# Patient Record
Sex: FEMALE | Race: WHITE
[De-identification: names, ages, dates, MRNs, and addresses within clinical notes are randomized per-mention and may not be internally consistent; named-entity substitution may affect disease eponyms.]

---

## 2018-01-24 ENCOUNTER — HOSPITAL ENCOUNTER (OUTPATIENT)
Dept: HOSPITAL 62 - OD | Age: 68
End: 2018-01-24
Attending: FAMILY MEDICINE
Payer: MEDICARE

## 2018-01-24 DIAGNOSIS — E66.01: ICD-10-CM

## 2018-01-24 DIAGNOSIS — E78.5: Primary | ICD-10-CM

## 2018-01-24 DIAGNOSIS — E55.9: ICD-10-CM

## 2018-01-24 DIAGNOSIS — M15.0: ICD-10-CM

## 2018-01-24 LAB
ALBUMIN SERPL-MCNC: 4.2 G/DL (ref 3.5–5)
ALP SERPL-CCNC: 102 U/L (ref 38–126)
ALT SERPL-CCNC: 38 U/L (ref 9–52)
ANION GAP SERPL CALC-SCNC: 9 MMOL/L (ref 5–19)
AST SERPL-CCNC: 25 U/L (ref 14–36)
BILIRUB DIRECT SERPL-MCNC: 0.3 MG/DL (ref 0–0.4)
BILIRUB SERPL-MCNC: 0.6 MG/DL (ref 0.2–1.3)
BUN SERPL-MCNC: 20 MG/DL (ref 7–20)
CALCIUM: 10.9 MG/DL (ref 8.4–10.2)
CHLORIDE SERPL-SCNC: 108 MMOL/L (ref 98–107)
CHOLEST SERPL-MCNC: 217.25 MG/DL (ref 0–200)
CO2 SERPL-SCNC: 26 MMOL/L (ref 22–30)
GLUCOSE SERPL-MCNC: 93 MG/DL (ref 75–110)
POTASSIUM SERPL-SCNC: 4.2 MMOL/L (ref 3.6–5)
PROT SERPL-MCNC: 6.8 G/DL (ref 6.3–8.2)
SODIUM SERPL-SCNC: 142.5 MMOL/L (ref 137–145)

## 2018-01-24 PROCEDURE — 80053 COMPREHEN METABOLIC PANEL: CPT

## 2018-01-24 PROCEDURE — 36415 COLL VENOUS BLD VENIPUNCTURE: CPT

## 2018-01-24 PROCEDURE — 82306 VITAMIN D 25 HYDROXY: CPT

## 2018-01-24 PROCEDURE — 82465 ASSAY BLD/SERUM CHOLESTEROL: CPT

## 2018-02-26 ENCOUNTER — HOSPITAL ENCOUNTER (OUTPATIENT)
Dept: HOSPITAL 62 - WI | Age: 68
End: 2018-02-26
Attending: FAMILY MEDICINE
Payer: MEDICARE

## 2018-02-26 DIAGNOSIS — Z12.31: Primary | ICD-10-CM

## 2018-02-26 PROCEDURE — 77063 BREAST TOMOSYNTHESIS BI: CPT

## 2018-02-26 PROCEDURE — 77067 SCR MAMMO BI INCL CAD: CPT

## 2018-02-26 NOTE — WOMENS IMAGING REPORT
EXAM DESCRIPTION:  3D SCREENING MAMMO BILAT



COMPLETED DATE/TIME:  2/26/2018 10:26 am



REASON FOR STUDY:  SCREENING MAMMO



COMPARISON:  2014 to 2017



TECHNIQUE:  Standard craniocaudal and mediolateral oblique views of each breast recorded using digita
l acquisition and breast tomosynthesis.



LIMITATIONS:  None.



FINDINGS:  No masses, calcifications or architectural distortion. No areas of suspicion.

Read with the assistance of CAD.

.[facility] - R2 Cenova Version 1.3

.[facility] - R2 Cenova Version 1.3

[facility] - R2 Cenova Version 2.4

.[facility] - R2 Cenova Version 2.4

.[facility] - R2  Version 9.2



IMPRESSION:  NORMAL MAMMOGRAM.  BIRADS 1.



BREAST DENSITY:  b. There are scattered areas of fibroglandular density.



BIRAD:  1 NEGATIVE



RECOMMENDATION:  ROUTINE SCREENING



COMMENT:  The patient has been notified of the results by letter per MQSA requirements. Additional no
tification policies are in place for contacting patient with suspicious or incomplete findings.

Quality ID #225: The American College of Radiology recommends an annual screening mammogram for women
 aged 40 years or over. This facility utilizes a reminder system to ensure that all patients receive 
reminder letters, and/or direct phone calls for appointments. This includes reminders for routine scr
eening mammograms, diagnostic mammograms, or other Breast Imaging Interventions when appropriate.  Th
is patient will be placed in the appropriate reminder system.

The American College of Radiology (ACR) has developed recommendations for screening MRI of the breast
s in certain patient populations, to be used in conjunction with mammography.  Breast MRI surveillanc
e may be appropriate for women with more than 20% lifetime risk of developing breast cancer  as deter
mined by genetic testing, significant family history of the disease, or history of mantle radiation f
or Hodgkins Disease.  ACR Practice Guidelines 2008.

[employer]



PQRS 6045F: Fluoroscopic imaging is not utilized for breast tomosynthesis.



TECHNICAL DOCUMENTATION:  FINDING NUMBER: (1)

ASSESSMENT:  (1)

JOB ID:  [id number]

 2011 [employer]- All Rights Reserved



Reading location - IP/workstation name: [email or web address]

## 2018-04-26 ENCOUNTER — HOSPITAL ENCOUNTER (OUTPATIENT)
Dept: HOSPITAL 62 - OD | Age: 68
End: 2018-04-26
Attending: FAMILY MEDICINE
Payer: MEDICARE

## 2018-04-26 DIAGNOSIS — M54.5: Primary | ICD-10-CM

## 2018-04-26 PROCEDURE — 72110 X-RAY EXAM L-2 SPINE 4/>VWS: CPT

## 2018-04-26 NOTE — RADIOLOGY REPORT (SQ)
EXAM DESCRIPTION:  LUMBAR SPINE COMPLETE



COMPLETED DATE/TIME:  4/26/2018 3:08 pm



REASON FOR STUDY:  LOW BACK PAIN M54.5  LOW BACK PAIN



COMPARISON:  None.



NUMBER OF VIEWS:  Five views including obliques.



TECHNIQUE:  AP, lateral, oblique, and sacral radiographic images acquired of the lumbar spine.



LIMITATIONS:  None.



FINDINGS:  MINERALIZATION: Normal.

SEGMENTATION: Normal.  No transitional anatomy.

ALIGNMENT: Normal.

VERTEBRAE: Maintained height.  No fracture or worrisome bone lesion.

DISCS: Preserved height.  No significant osteophytes or end plate irregularity.

POSTERIOR ELEMENTS: Pedicles and facets are intact.  No pars defect or posterior arch defects.

HARDWARE: None in the spine.

PARASPINAL SOFT TISSUES: Normal.

PELVIS: Intact as visualized. No fractures or worrisome bone lesions. SI joints intact.

OTHER: No other significant finding.



IMPRESSION:  NORMAL 5 VIEW LUMBAR SPINE.



TECHNICAL DOCUMENTATION:  JOB ID:  [id number]

 2011 [employer]- All Rights Reserved



Reading location - IP/workstation name: [email or web address]

## 2019-03-01 ENCOUNTER — HOSPITAL ENCOUNTER (OUTPATIENT)
Dept: HOSPITAL 62 - WI | Age: 69
End: 2019-03-01
Attending: PHYSICIAN ASSISTANT
Payer: MEDICARE

## 2019-03-01 DIAGNOSIS — M81.0: Primary | ICD-10-CM

## 2019-03-01 DIAGNOSIS — Z12.31: ICD-10-CM

## 2019-03-01 PROCEDURE — 77063 BREAST TOMOSYNTHESIS BI: CPT

## 2019-03-01 PROCEDURE — 77067 SCR MAMMO BI INCL CAD: CPT

## 2019-03-01 PROCEDURE — 77080 DXA BONE DENSITY AXIAL: CPT

## 2019-03-01 NOTE — WOMENS IMAGING REPORT
EXAM DESCRIPTION:  3D SCREENING MAMMO BILAT



COMPLETED DATE/TIME:  3/1/2019 9:43 am



REASON FOR STUDY:  Z12.31 ENCOUNTER FOR SCREENING MAMMOGRAM FOR MALIGNANT NEOPLASM OF BREAST M81.0  A
GE-RELATED OSTEOPOROSIS W/O CURRENT PATHOLOGICAL FRAC Z12.31  ENCNTR SCREEN MAMMOGRAM FOR MALIGNANT N
EOPLASM OF ANETTE



COMPARISON:  Multiple since 2014



TECHNIQUE:  Standard craniocaudal and mediolateral oblique views of each breast recorded using digita
l acquisition and breast tomosynthesis.



LIMITATIONS:  None.



FINDINGS:  No masses, calcifications or architectural distortion. No areas of suspicion.

Read with the assistance of CAD.

.[facility] - R2 Cenova Version 1.3

.[facility] - R2 Cenova Version 2.1

.[facility] - R2 Cenova Version 2.4

.[facility] - R2 Cenova Version 2.4

.[facility] - R2  Version 9.2



IMPRESSION:  NORMAL MAMMOGRAM.  BIRADS 1.



BREAST DENSITY:  b. There are scattered areas of fibroglandular density.



BIRAD:  1 NEGATIVE



RECOMMENDATION:  ROUTINE SCREENING



COMMENT:  The patient has been notified of the results by letter per SA requirements. Additional no
tification policies are in place for contacting patient with suspicious or incomplete findings.

Quality ID #225: The American College of Radiology recommends an annual screening mammogram for women
 aged 40 years or over. This facility utilizes a reminder system to ensure that all patients receive 
reminder letters, and/or direct phone calls for appointments. This includes reminders for routine scr
eening mammograms, diagnostic mammograms, or other Breast Imaging Interventions when appropriate.  Th
is patient will be placed in the appropriate reminder system.

The American College of Radiology (ACR) has developed recommendations for screening MRI of the breast
s in certain patient populations, to be used in conjunction with mammography.  Breast MRI surveillanc
e may be appropriate for women with more than 20% lifetime risk of developing breast cancer  as deter
mined by genetic testing, significant family history of the disease, or history of mantle radiation f
or Hodgkins Disease.  ACR Practice Guidelines 2008.

[employer]



PQRS 6045F: Fluoroscopic imaging is not utilized for breast tomosynthesis.



TECHNICAL DOCUMENTATION:  FINDING NUMBER: (1)

ASSESSMENT:  (1)

JOB ID:  [id number]

 2011 [employer]- All Rights Reserved



Reading location - IP/workstation name: [email or web address]

## 2019-03-01 NOTE — WOMENS IMAGING REPORT
EXAM DESCRIPTION:  BONE DENSITY HIP/SPINE



COMPLETED DATE/TIME:  3/1/2019 9:43 am



REASON FOR STUDY:  M81.0 AGE RELATED OSTEOPOROSIS WITHOUT CURRENT PATHOLOGICAL FRACTURE M81.0  AGE-RE
LATED OSTEOPOROSIS W/O CURRENT PATHOLOGICAL FRAC Z12.31  ENCNTR SCREEN MAMMOGRAM FOR MALIGNANT NEOPLA
 OF [facility]



COMPARISON:   None.



TECHNIQUE:  Dual-Energy X-ray Absorptiometry (DEXA) of the AP Spine and Hip.



LIMITATIONS:  None.



FINDINGS:  LUMBAR SPINE:

The bone mineral density (BMD) measured from L1-L4 in the AP projection correlates with a T-score of 
-1.9, which is osteopenia as defined by the World Health Organization.  BMD change since the previous
 examination dated 7/18/2016 is 0.5%.

HIP:

The bone mineral density (BMD) measured in the left hip correlates with a T-score of -3.0, which is o
steoporosis as defined by the World Health Organization.  BMD change since the previous examination d
ated 7/18/2016 is -6.8%.



IMPRESSION:  1. LUMBAR SPINE: OSTEOPENIA.

2.  HIP: OSTEOPOROSIS.



COMMENT:  The World Health Organization defines low BMD as follows:

T-score:

Normal:  Greater than -1.0

Osteopenia: Between -1.0 and -2.5

Osteoporosis:  Less than -2.5 without fractures

Established osteoporosis:  Less than -2.5 with fractures

In general, you may wish to consider:

Diagnosis          Treatment                     Follow-up DEXA

Normal BMD      Prevention                    2-3 years

Osteopenia       Prevention/Therapy        1-2 years

Osteoporosis     Therapy                        Yearly



TECHNICAL DOCUMENTATION:  JOB ID:  [id number]

 2011 [employer]- All Rights Reserved



Reading location - IP/workstation name: [email or web address]

## 2020-08-26 ENCOUNTER — HOSPITAL ENCOUNTER (EMERGENCY)
Dept: HOSPITAL 62 - ER | Age: 70
Discharge: HOME | End: 2020-08-26
Payer: MEDICARE

## 2020-08-26 VITALS — DIASTOLIC BLOOD PRESSURE: 78 MMHG | SYSTOLIC BLOOD PRESSURE: 147 MMHG

## 2020-08-26 DIAGNOSIS — R63.0: ICD-10-CM

## 2020-08-26 DIAGNOSIS — K21.9: ICD-10-CM

## 2020-08-26 DIAGNOSIS — Z90.49: ICD-10-CM

## 2020-08-26 DIAGNOSIS — Z79.82: ICD-10-CM

## 2020-08-26 DIAGNOSIS — I83.893: ICD-10-CM

## 2020-08-26 DIAGNOSIS — R11.2: ICD-10-CM

## 2020-08-26 DIAGNOSIS — Z87.19: ICD-10-CM

## 2020-08-26 DIAGNOSIS — R10.816: ICD-10-CM

## 2020-08-26 DIAGNOSIS — E66.9: ICD-10-CM

## 2020-08-26 DIAGNOSIS — N39.0: Primary | ICD-10-CM

## 2020-08-26 LAB
ADD MANUAL DIFF: NO
ALBUMIN SERPL-MCNC: 4.4 G/DL (ref 3.5–5)
ALP SERPL-CCNC: 98 U/L (ref 38–126)
ANION GAP SERPL CALC-SCNC: 8 MMOL/L (ref 5–19)
APPEARANCE UR: (no result)
APTT PPP: (no result) S
AST SERPL-CCNC: 26 U/L (ref 14–36)
BASOPHILS # BLD AUTO: 0 10^3/UL (ref 0–0.2)
BASOPHILS NFR BLD AUTO: 0.2 % (ref 0–2)
BILIRUB DIRECT SERPL-MCNC: 0.1 MG/DL (ref 0–0.4)
BILIRUB SERPL-MCNC: 0.9 MG/DL (ref 0.2–1.3)
BILIRUB UR QL STRIP: NEGATIVE
BUN SERPL-MCNC: 10 MG/DL (ref 7–20)
CALCIUM: 10.4 MG/DL (ref 8.4–10.2)
CHLORIDE SERPL-SCNC: 99 MMOL/L (ref 98–107)
CO2 SERPL-SCNC: 24 MMOL/L (ref 22–30)
EOSINOPHIL # BLD AUTO: 0 10^3/UL (ref 0–0.6)
EOSINOPHIL NFR BLD AUTO: 0.2 % (ref 0–6)
ERYTHROCYTE [DISTWIDTH] IN BLOOD BY AUTOMATED COUNT: 13.5 % (ref 11.5–14)
GLUCOSE SERPL-MCNC: 103 MG/DL (ref 75–110)
GLUCOSE UR STRIP-MCNC: NEGATIVE MG/DL
HCT VFR BLD CALC: 42.6 % (ref 36–47)
HGB BLD-MCNC: 14 G/DL (ref 12–15.5)
KETONES UR STRIP-MCNC: 20 MG/DL
LYMPHOCYTES # BLD AUTO: 1.3 10^3/UL (ref 0.5–4.7)
LYMPHOCYTES NFR BLD AUTO: 20.5 % (ref 13–45)
MCH RBC QN AUTO: 27.6 PG (ref 27–33.4)
MCHC RBC AUTO-ENTMCNC: 32.8 G/DL (ref 32–36)
MCV RBC AUTO: 84 FL (ref 80–97)
MONOCYTES # BLD AUTO: 0.5 10^3/UL (ref 0.1–1.4)
MONOCYTES NFR BLD AUTO: 8.3 % (ref 3–13)
NEUTROPHILS # BLD AUTO: 4.5 10^3/UL (ref 1.7–8.2)
NEUTS SEG NFR BLD AUTO: 70.8 % (ref 42–78)
NITRITE UR QL STRIP: NEGATIVE
PH UR STRIP: 5 [PH] (ref 5–9)
PLATELET # BLD: 207 10^3/UL (ref 150–450)
POTASSIUM SERPL-SCNC: 4 MMOL/L (ref 3.6–5)
PROT SERPL-MCNC: 7.1 G/DL (ref 6.3–8.2)
PROT UR STRIP-MCNC: 30 MG/DL
RBC # BLD AUTO: 5.06 10^6/UL (ref 3.72–5.28)
SP GR UR STRIP: 1.02
TOTAL CELLS COUNTED % (AUTO): 100 %
UROBILINOGEN UR-MCNC: NEGATIVE MG/DL (ref ?–2)
WBC # BLD AUTO: 6.3 10^3/UL (ref 4–10.5)

## 2020-08-26 PROCEDURE — 99284 EMERGENCY DEPT VISIT MOD MDM: CPT

## 2020-08-26 PROCEDURE — 85025 COMPLETE CBC W/AUTO DIFF WBC: CPT

## 2020-08-26 PROCEDURE — 96375 TX/PRO/DX INJ NEW DRUG ADDON: CPT

## 2020-08-26 PROCEDURE — 96361 HYDRATE IV INFUSION ADD-ON: CPT

## 2020-08-26 PROCEDURE — S0028 INJECTION, FAMOTIDINE, 20 MG: HCPCS

## 2020-08-26 PROCEDURE — 96365 THER/PROPH/DIAG IV INF INIT: CPT

## 2020-08-26 PROCEDURE — 93010 ELECTROCARDIOGRAM REPORT: CPT

## 2020-08-26 PROCEDURE — 93005 ELECTROCARDIOGRAM TRACING: CPT

## 2020-08-26 PROCEDURE — 81001 URINALYSIS AUTO W/SCOPE: CPT

## 2020-08-26 PROCEDURE — 83690 ASSAY OF LIPASE: CPT

## 2020-08-26 PROCEDURE — 36415 COLL VENOUS BLD VENIPUNCTURE: CPT

## 2020-08-26 PROCEDURE — 80053 COMPREHEN METABOLIC PANEL: CPT

## 2020-08-26 NOTE — ER DOCUMENT REPORT
ED General





- General


Chief Complaint: Abdominal Pain


Stated Complaint: ABDOMINAL PAIN,NAUSEA,LEG CRAMPS


Time Seen by Provider: 20 10:30


Primary Care Provider: 


AUREA LAZO PA-C [Primary Care Provider] - Follow up as needed


TRAVEL OUTSIDE OF THE [place] IN LAST 30 DAYS: No





- HPI


Notes: 





Chief complaint: Epigastric burning, nausea and anorexia





History of present illness: 70-year-old female with previous history of ch

olecystectomy and chronic/intermittent GERD for which she takes intermittent 

Nexium now presents with worsening GERD symptoms over the last 2 weeks.  Within 

the last 24 hours she is lost her appetite and says that she feels extremely 

nauseated to the point that she really does not want to eat.  She describes 

severe epigastric burning which is nonradiating.  She denies melena, 

hematochezia or hematemesis.  No fever or chills.  No dysuria.  Weight is 

stable.  She is never had a peptic ulcer in the past.  She is not previously had

an endoscopy.  She takes 81 mg aspirin for cardiovascular prophylaxis.  

Otherwise does not consume aspirin or NSAIDs.  Non-smoker.  No use of alcohol.





- Related Data


Allergies/Adverse Reactions: 


                                        





No Known Allergies Allergy (Verified 20 10:22)


   











Past Medical History





- General


Information source: Patient





- Social History


Smoking Status: Never Smoker


Frequency of alcohol use: None


Drug Abuse: None


Family History: Reviewed & Not Pertinent


Patient has homicidal ideation: No





- Past Medical History


Cardiac Medical History: 


   Denies: Hx Coronary Artery Disease, Hx Heart Attack, Hx Hypertension


Pulmonary Medical History: 


   Denies: Hx Asthma, Hx Bronchitis, Hx COPD, Hx Pneumonia


Neurological Medical History: Denies: Hx Cerebrovascular Accident, Hx Seizures


GI Medical History: Reports: Hx Diverticulitis


Musculoskeletal Medical History: Reports Hx Arthritis - lt knee


Past Surgical History: Reports: Hx Abdominal Surgery - umibilical hernia repair,

Hx  Section - x 2, Hx Cholecystectomy, Hx Hysterectomy, Hx Vascular 

Surgery - vein stripping BLE





- Immunizations


Hx Diphtheria, Pertussis, Tetanus Vaccination:  - ?





Review of Systems





- Review of Systems


Notes: 





Constitutional: Negative for fever.


HENT: Negative for sore throat.


Eyes: Negative for visual changes.


Cardiovascular: Negative for chest pain.


Respiratory: Negative for shortness of breath.


Gastrointestinal: As per HPI.


Genitourinary: Negative for dysuria.


Musculoskeletal: Negative for back pain.


Skin: Negative for rash.


Neurological: Negative for headaches, weakness or numbness.





10 point ROS negative except as marked above and in HPI.








Physical Exam





- Vital signs


Vitals: 


                                        











Temp Pulse Resp BP Pulse Ox


 


 99.0 F   75   18   154/78 H  95 


 


 20 09:59  20 09:59  20 09:59  20 09:59  20 09:59














- Notes


Notes: 











GENERAL: Mildly obese female approximately stated age appearing in mildly 

uncomfortable.





SKIN: Good turgor no rashes.





HEAD: Normocephalic atraumatic.





EYES: PERRLA.  EOMI.  Conjunctivae and sclerae clear.





EARS: CANALS AND TMS CLEAR.





NOSE: CLEAR.





MOUTH: Moist mucosa.  Good dentition.  No stridor or edema.  No drooling.





NECK: Supple.  No masses or thyromegaly.  No adenopathy.  Carotids 2+ without 

bruits.  No JVD.





BACK: Symmetrical without tenderness.





CHEST: Respirations unlabored.  Breath sounds clear and symmetrical.





HEART: Regular rhythm.  No murmur gallop or rub.





ABDOMEN: Mildly obese.  Minimal epigastric tenderness.  Soft without masses, 

organomegaly or rebound.  Bowel sounds normally active.  No bruits.





GENITALIA: Deferred.





EXTREMITIES: Broken superficial varicosities over both lower legs.  1+ pitting 

edema bilaterally pretibial area.  No calf tenderness.  Cap refill less than 1.5

seconds.  Dorsalis pedis and posterior tibial pulses 3+ and symmetrical.





NEUROLOGICAL: GCS 15.  Alert and oriented x3.  Normal gait.  Fluent speech.  

Cranial nerves II through XII intact.  Sensorimotor and cerebellar normal.  

Normal tone.





PSYCHIATRIC: Appropriate affect.





Course





- Re-evaluation


Re-evalutation: 





20 13:57


Patient was found to have evidence of a significant urinary tract infection.  

She is received IV normal saline and IV Rocephin as well as IV Zofran.  She 

feels much better and is ready for discharge.





Findings, clinical impression and plan of treatment have been discussed with 

patient/family.  Understanding of current findings and recommendations has been 

acknowledged by them and there is agreement regarding disposition and follow-up.





- Vital Signs


Vital signs: 


                                        











Temp Pulse Resp BP Pulse Ox


 


 99 F   75   18   154/78 H  95 


 


 20 10:00  20 09:59  20 09:59  20 09:59  20 09:59














- Laboratory


Result Diagrams: 


                                 20 11:12





                                 20 11:12


Laboratory results interpreted by me: 


                                        











  20





  10:20 11:12


 


Sodium   130.8 L


 


Calcium   10.4 H


 


Urine Protein  30 H 


 


Urine Ketones  20 H 


 


Urine Blood  MODERATE H 


 


Ur Leukocyte Esterase  LARGE H 


 


Urine Ascorbic Acid  20 H 














- EKG Interpretation by Me


Additional EKG results interpreted by me: 





20 13:58


Twelve-lead EKG from 1140 hrs. reviewed contemporaneously by me showing normal 

sinus rhythm rate of 63 and a normal QRS axis of +5 degrees as well as normal 

intervals.  No acute ST/T wave changes noted.  Impression normal tracing.  

Indication for current study: Upper abdominal pain.





Discharge





- Discharge


Clinical Impression: 


 Urinary tract infection, Upper abdominal pain





Vomiting


Qualifiers:


 Vomiting type: unspecified Vomiting Intractability: non-intractable Nausea 

presence: with nausea Qualified Code(s): R11.2 - Nausea with vomiting, unspecif

ied





Condition: Stable


Disposition: HOME, SELF-CARE


Additional Instructions: 


Urinary Tract Infection





     Your evaluation indicates that you have a urinary tract infection. This is 

due to germs growing in the bladder.  This is a common problem.


     This infection usually responds quickly to antibiotics.  Your antibiotic 

should be taken exactly as prescribed.  Drink plenty of fluids -- three to four 

quarts a day.


     Occasionally, a bladder anesthetic will be prescribed to help stop the 

feeling of urgency until the antibiotic has a chance to clear the infection.  

This may cause your urine to be dark orange.


     Certain urine infections require a culture.  If the doctor obtained a 

culture, the results will be back in two days.  You should call to see if a 

change in treatment is needed.


     A repeat urinalysis after you finish treatment is often recommended.  The 

physician will let you know if further testing is required.


     Call the doctor if you develop fever, chills, flank pain, inability to 

urinate, or blood in the urine.








Take prescribed medications as directed.





Increase oral fluids.





Work note for the next 3 days will be provided for you.





Follow-up with your primary care physician in the next 3 to 5 days.





Return here as needed for new or worsening symptoms:





Pain that is worsening or unimproved


Uncontrolled vomiting


High fever or shaking chills


Overall worsening


Prescriptions: 


Cephalexin Monohydrate [Keflex 500 mg Capsule] 500 mg PO Q6H 10 Days #40 capsule


Ondansetron [Zofran Odt 4 mg Tablet] 1 - 2 tab PO Q4H PRN #15 tab.rapdis


 PRN Reason: For Nausea/Vomiting


Forms:  Return to Work


Referrals: 


AUREA LAZO PA-C [Primary Care Provider] - Follow up as needed

## 2020-11-29 ENCOUNTER — HOSPITAL ENCOUNTER (EMERGENCY)
Dept: HOSPITAL 62 - ER | Age: 70
Discharge: HOME | End: 2020-11-29
Payer: MEDICARE

## 2020-11-29 VITALS — SYSTOLIC BLOOD PRESSURE: 131 MMHG | DIASTOLIC BLOOD PRESSURE: 76 MMHG

## 2020-11-29 DIAGNOSIS — Z87.891: ICD-10-CM

## 2020-11-29 DIAGNOSIS — R63.0: ICD-10-CM

## 2020-11-29 DIAGNOSIS — R04.2: ICD-10-CM

## 2020-11-29 DIAGNOSIS — U07.1: Primary | ICD-10-CM

## 2020-11-29 DIAGNOSIS — J34.89: ICD-10-CM

## 2020-11-29 DIAGNOSIS — J06.9: ICD-10-CM

## 2020-11-29 DIAGNOSIS — R43.8: ICD-10-CM

## 2020-11-29 DIAGNOSIS — K21.9: ICD-10-CM

## 2020-11-29 DIAGNOSIS — Z79.899: ICD-10-CM

## 2020-11-29 LAB
ADD MANUAL DIFF: NO
ALBUMIN SERPL-MCNC: 3.8 G/DL (ref 3.5–5)
ALP SERPL-CCNC: 119 U/L (ref 38–126)
ANION GAP SERPL CALC-SCNC: 7 MMOL/L (ref 5–19)
AST SERPL-CCNC: 63 U/L (ref 14–36)
BASOPHILS # BLD AUTO: 0 10^3/UL (ref 0–0.2)
BASOPHILS NFR BLD AUTO: 0.3 % (ref 0–2)
BILIRUB DIRECT SERPL-MCNC: 0.1 MG/DL (ref 0–0.4)
BILIRUB SERPL-MCNC: 0.7 MG/DL (ref 0.2–1.3)
BUN SERPL-MCNC: 11 MG/DL (ref 7–20)
CALCIUM: 10.2 MG/DL (ref 8.4–10.2)
CHLORIDE SERPL-SCNC: 107 MMOL/L (ref 98–107)
CO2 SERPL-SCNC: 27 MMOL/L (ref 22–30)
EOSINOPHIL # BLD AUTO: 0 10^3/UL (ref 0–0.6)
EOSINOPHIL NFR BLD AUTO: 1.1 % (ref 0–6)
ERYTHROCYTE [DISTWIDTH] IN BLOOD BY AUTOMATED COUNT: 13.4 % (ref 11.5–14)
GLUCOSE SERPL-MCNC: 97 MG/DL (ref 75–110)
HCT VFR BLD CALC: 39.1 % (ref 36–47)
HGB BLD-MCNC: 12.6 G/DL (ref 12–15.5)
INR PPP: 0.87
LYMPHOCYTES # BLD AUTO: 1.6 10^3/UL (ref 0.5–4.7)
LYMPHOCYTES NFR BLD AUTO: 45 % (ref 13–45)
MCH RBC QN AUTO: 27.3 PG (ref 27–33.4)
MCHC RBC AUTO-ENTMCNC: 32.2 G/DL (ref 32–36)
MCV RBC AUTO: 85 FL (ref 80–97)
MONOCYTES # BLD AUTO: 0.4 10^3/UL (ref 0.1–1.4)
MONOCYTES NFR BLD AUTO: 11.2 % (ref 3–13)
NEUTROPHILS # BLD AUTO: 1.5 10^3/UL (ref 1.7–8.2)
NEUTS SEG NFR BLD AUTO: 42.4 % (ref 42–78)
PLATELET # BLD: 152 10^3/UL (ref 150–450)
POTASSIUM SERPL-SCNC: 3.8 MMOL/L (ref 3.6–5)
PROT SERPL-MCNC: 6.5 G/DL (ref 6.3–8.2)
PROTHROMBIN TIME: 12.1 SEC (ref 11.4–15.4)
RBC # BLD AUTO: 4.61 10^6/UL (ref 3.72–5.28)
TOTAL CELLS COUNTED % (AUTO): 100 %
WBC # BLD AUTO: 3.5 10^3/UL (ref 4–10.5)

## 2020-11-29 PROCEDURE — 87635 SARS-COV-2 COVID-19 AMP PRB: CPT

## 2020-11-29 PROCEDURE — 71045 X-RAY EXAM CHEST 1 VIEW: CPT

## 2020-11-29 PROCEDURE — 85025 COMPLETE CBC W/AUTO DIFF WBC: CPT

## 2020-11-29 PROCEDURE — 85610 PROTHROMBIN TIME: CPT

## 2020-11-29 PROCEDURE — 80053 COMPREHEN METABOLIC PANEL: CPT

## 2020-11-29 PROCEDURE — C9803 HOPD COVID-19 SPEC COLLECT: HCPCS

## 2020-11-29 PROCEDURE — 36415 COLL VENOUS BLD VENIPUNCTURE: CPT

## 2020-11-29 PROCEDURE — 99284 EMERGENCY DEPT VISIT MOD MDM: CPT

## 2020-11-29 NOTE — ER DOCUMENT REPORT
ED Respiratory Problem





- General


Chief Complaint: Nasal Congestion


Stated Complaint: CONGESTION,LOST TASTE/SMELL


Time Seen by Provider: 20 09:41


Primary Care Provider: 


AUREA LAZO PA-C [Primary Care Provider] - Follow up as needed


Mode of Arrival: Ambulatory


Information source: Patient


Notes: 





Patient states she had cold symptoms last week and yesterday coughed and noticed

blood in her sputum.  Patient states that 5 days ago she had a bloody nose.  

Patient states that she has loss of sense of taste and smell has a decrease in 

appetite.  Patient denies any fever, nausea, vomiting or diarrhea.  Patient 

denies any chest pain or shortness of breath.  Patient denies any other abnormal

bleeding or bruising.  Patient states that she did have some blood in her sputum

before and after having a nosebleed.


TRAVEL OUTSIDE OF THE [place] IN LAST 30 DAYS: No





- HPI


Patient complains to provider of: Cough.  No: Chest pain, Short of breath


Onset: Last week


Duration: Continuous


Pain Level: Denies


Context: denies: Hx CHF, Recent immobilization, Recent surgery, Smoker


Sputum color: Red Specks


Associated symptoms: Cough, Runny nose.  denies: Congestion, Fever


Similar symptoms previously: No


Recently seen / treated by doctor: No





- Related Data


Allergies/Adverse Reactions: 


                                        





No Known Allergies Allergy (Verified 20 09:23)


   








Home Medications: multivit areds, omeprazole.





Past Medical History





- General


Information source: Patient





- Social History


Smoking Status: Former Smoker


Frequency of alcohol use: None


Drug Abuse: None


Occupation: none


Family History: Reviewed & Not Pertinent


Patient has homicidal ideation: No





- Past Medical History


Cardiac Medical History: 


   Denies: Hx Coronary Artery Disease, Hx DVT, Hx Heart Attack, Hx Hypertension,

Hx Pulmonary Embolism


Pulmonary Medical History: 


   Denies: Hx Asthma, Hx Bronchitis, Hx COPD, Hx Pneumonia


EENT Medical History: Reports: Other - Macular degeneration


Neurological Medical History: Denies: Hx Cerebrovascular Accident, Hx Seizures


GI Medical History: Reports: Hx Diverticulitis, Hx Gastroesophageal Reflux 

Disease


Musculoskeletal Medical History: Reports Hx Arthritis - lt knee


Past Surgical History: Reports: Hx Abdominal Surgery - umibilical hernia repair,

Hx  Section - x 2, Hx Cholecystectomy, Hx Hysterectomy, Hx Vascular 

Surgery - vein stripping BLE





- Immunizations


Hx Diphtheria, Pertussis, Tetanus Vaccination:  - ?





Review of Systems





- Review of Systems


Constitutional: Recent illness -  Signs and Symptoms of COVID-19 (e.g., fever, 

dry cough, and/or SOB), no diagnosis made (assign the appropriate code(s)):    [

  Fever, unspecified R50.9  ]    [  Cough R05  ]    [  Shortness of breath 

R06.02  ].  denies: Fever


EENT: Nose congestion, Other - Nosebleed 5 days ago.  denies: Throat pain


Cardiovascular: No symptoms reported.  denies: Chest pain, Syncope, Dizziness, 

Lightheaded


Respiratory: Cough, Hemoptysis


Gastrointestinal: No symptoms reported.  denies: Abdominal pain, Diarrhea, 

Nausea, Vomiting


Genitourinary: No symptoms reported


Female Genitourinary: No symptoms reported


Musculoskeletal: No symptoms reported


Skin: No symptoms reported.  denies: Change in color


Hematologic/Lymphatic: No symptoms reported


Neurological/Psychological: No symptoms reported





Physical Exam





- Vital signs


Vitals: 


                                        











Temp Pulse Resp BP Pulse Ox


 


 97.8 F   75   20   127/69 H  100 


 


 20 09:11  20 09:11  20 09:11  20 09:11  20 09:11














- General


General appearance: Appears well, Alert


In distress: None





- HEENT


Head: Normocephalic, Atraumatic


Eyes: Normal


Conjunctiva: Normal


Ears: Normal


External canal: Normal


Tympanic membrane: Normal


Sinus: Normal


Nasal: Clear rhinorrhea, Other - Dried bloody drainage to left nostril


Mouth/Lips: Normal


Mucous membranes: Normal


Pharynx: Normal.  No: Blood in hypopharynx


Neck: Normal, Supple.  No: Lymphadenopathy





- Respiratory


Respiratory status: No respiratory distress


Chest status: Nontender


Breath sounds: Normal.  No: Rales, Rhonchi, Stridor, Wheezing


Chest palpation: Normal





- Cardiovascular


Rhythm: Regular.  No: Tachycardia


Heart sounds: S1 appreciated, S2 appreciated





- Back


Back: Normal





- Extremities


General upper extremity: Normal inspection, Normal ROM


General lower extremity: Normal inspection, Normal ROM





- Neurological


Neuro grossly intact: Yes


Cognition: Normal


[place] Coma Scale Eye Opening: Spontaneous


[place] Coma Scale Verbal: Oriented


[place] Coma Scale Motor: Obeys Commands


[place] Coma Scale Total: 15





- Psychological


Associated symptoms: Normal affect, Normal mood





- Skin


Skin Temperature: Warm


Skin Moisture: Dry


Skin Color: Normal





Course





- Re-evaluation


Re-evalutation: 





20 11:48


Patient nontoxic in appearance without any tachycardia tachypnea or hypoxia.  

Patient without any active nosebleed at this time.  Patient with very minimal 

cough that she reports has been improving.  No evidence for pneumonia on x-ray 

or pneumothorax.  No concern for PE at this time.





The patient was evaluated during the global Covid 19 pandemic, and that 

diagnosis was suspected/considered upon their initial presentation.  Their 

evaluation, treatment and testing was consistent with current guidelines for pa

tients who present with complaints or symptoms that may be related to Covid 19.





Patient presents with upper respiratory symptoms worrisome for possible Covid 

19.  Patient does not have emergency worrying symptoms such as difficulty 

breathing, shortness of breath, chest pain, pressure, confusion or cyanosis.  

Patient appears suitable for discharge as vital signs are stable and patient is 

nontoxic in appearance.  Good return precautions have been discussed with 

patient, patient verbalized understanding and is agreeable with discharge plan 

of care at this time.


20 17:25








- Vital Signs


Vital signs: 


                                        











Temp Pulse Resp BP Pulse Ox


 


 97.8 F   71   20   131/76 H  96 


 


 20 12:24  20 12:24  20 12:24  20 12:24  20 12:24














- Laboratory


Result Diagrams: 


                                 20 10:35





                                 20 10:35


Laboratory results interpreted by me: 


                                        











  20





  10:35 10:35


 


WBC  3.5 L 


 


Absolute Neuts (auto)  1.5 L 


 


AST   63 H


 


ALT   68 H











                              Labs- All tests 24 hr











  20





  10:30 10:35 10:35


 


WBC   3.5 L 


 


RBC   4.61 


 


Hgb   12.6 


 


Hct   39.1 


 


MCV   85 


 


MCH   27.3 


 


MCHC   32.2 


 


RDW   13.4 


 


Plt Count   152 


 


Lymph % (Auto)   45.0 


 


Mono % (Auto)   11.2 


 


Eos % (Auto)   1.1 


 


Baso % (Auto)   0.3 


 


Absolute Neuts (auto)   1.5 L 


 


Absolute Lymphs (auto)   1.6 


 


Absolute Monos (auto)   0.4 


 


Absolute Eos (auto)   0.0 


 


Absolute Basos (auto)   0.0 


 


Seg Neutrophils %   42.4 


 


PT    12.1


 


INR    0.87


 


Sodium   


 


Potassium   


 


Chloride   


 


Carbon Dioxide   


 


Anion Gap   


 


BUN   


 


Creatinine   


 


Est GFR ( Amer)   


 


Est GFR (MDRD) Non-Af   


 


Glucose   


 


Calcium   


 


Total Bilirubin   


 


Direct Bilirubin   


 


Neonat Total Bilirubin   


 


Neonat Direct Bilirubin   


 


Neonat Indirect Bili   


 


AST   


 


ALT   


 


Alkaline Phosphatase   


 


Total Protein   


 


Albumin   


 


COVID-19 Source  See comment  














  20





  10:35


 


WBC 


 


RBC 


 


Hgb 


 


Hct 


 


MCV 


 


MCH 


 


MCHC 


 


RDW 


 


Plt Count 


 


Lymph % (Auto) 


 


Mono % (Auto) 


 


Eos % (Auto) 


 


Baso % (Auto) 


 


Absolute Neuts (auto) 


 


Absolute Lymphs (auto) 


 


Absolute Monos (auto) 


 


Absolute Eos (auto) 


 


Absolute Basos (auto) 


 


Seg Neutrophils % 


 


PT 


 


INR 


 


Sodium  141.4


 


Potassium  3.8


 


Chloride  107


 


Carbon Dioxide  27


 


Anion Gap  7


 


BUN  11


 


Creatinine  0.67


 


Est GFR ( Amer)  > 60


 


Est GFR (MDRD) Non-Af  > 60


 


Glucose  97


 


Calcium  10.2


 


Total Bilirubin  0.7


 


Direct Bilirubin  0.1


 


Neonat Total Bilirubin  Not Reportable


 


Neonat Direct Bilirubin  Not Reportable


 


Neonat Indirect Bili  Not Reportable


 


AST  63 H


 


ALT  68 H


 


Alkaline Phosphatase  119


 


Total Protein  6.5


 


Albumin  3.8


 


COVID-19 Source 














- Diagnostic Test


Radiology reviewed: Reports reviewed





Discharge





- Discharge


Clinical Impression: 


 Encounter for screening laboratory testing for COVID-19 virus, Hx of epistaxis





Upper respiratory infection


Qualifiers:


 URI type: unspecified URI Qualified Code(s): J06.9 - Acute upper respiratory 

infection, unspecified





Condition: Stable


Disposition: HOME, SELF-CARE


Instructions:  COVID-19 Guidance for Persons Under Investigation, Nosebleed 

Instructions ([facility]), Upper Respiratory Illness ([facility])


Additional Instructions: 


Return immediately for any new or worsening symptoms





Followup with your primary care provider, call tomorrow to make a followup 

appointment





Your liver function tests were mildly elevated today.  Your primary doctor can 

repeat these tests on an outpatient basis.  Avoid use of alcohol.


Referrals: 


AUREA LAZO PA-C [Primary Care Provider] - Follow up as needed

## 2020-11-29 NOTE — RADIOLOGY REPORT (SQ)
EXAM DESCRIPTION:  CHEST SINGLE VIEW



IMAGES COMPLETED DATE/TIME:  11/29/2020 10:34 am



REASON FOR STUDY:  cough



COMPARISON:  None.



EXAM PARAMETERS:  NUMBER OF VIEWS: One view.

TECHNIQUE: Single frontal radiographic view of the chest acquired.

RADIATION DOSE: NA

LIMITATIONS: None.



FINDINGS:  LUNGS AND PLEURA: No opacities, masses or pneumothorax. No pleural effusion.

MEDIASTINUM AND HILAR STRUCTURES: No masses.  Contour normal.

HEART AND VASCULAR STRUCTURES: Heart normal in size.  Normal vasculature.

BONES: No acute findings.

HARDWARE: None in the chest.

OTHER: No other significant finding.



IMPRESSION:  NO ACUTE RADIOGRAPHIC FINDING IN THE CHEST.



TECHNICAL DOCUMENTATION:  JOB ID:  [id number]

 2011 [employer]- All Rights Reserved



Reading location - IP/workstation name: [email or web address]